# Patient Record
Sex: MALE | Race: WHITE | NOT HISPANIC OR LATINO | URBAN - METROPOLITAN AREA
[De-identification: names, ages, dates, MRNs, and addresses within clinical notes are randomized per-mention and may not be internally consistent; named-entity substitution may affect disease eponyms.]

---

## 2019-10-21 ENCOUNTER — OFFICE VISIT (OUTPATIENT)
Dept: PULMONOLOGY | Facility: HOSPICE | Age: 49
End: 2019-10-21
Payer: MEDICAID

## 2019-10-21 VITALS
BODY MASS INDEX: 35.01 KG/M2 | HEIGHT: 68 IN | SYSTOLIC BLOOD PRESSURE: 140 MMHG | OXYGEN SATURATION: 92 % | HEART RATE: 87 BPM | RESPIRATION RATE: 16 BRPM | DIASTOLIC BLOOD PRESSURE: 80 MMHG | WEIGHT: 231 LBS

## 2019-10-21 DIAGNOSIS — I27.20 PULMONARY HYPERTENSION (HCC): ICD-10-CM

## 2019-10-21 DIAGNOSIS — E66.01 CLASS 2 SEVERE OBESITY WITH SERIOUS COMORBIDITY AND BODY MASS INDEX (BMI) OF 35.0 TO 35.9 IN ADULT, UNSPECIFIED OBESITY TYPE (HCC): ICD-10-CM

## 2019-10-21 DIAGNOSIS — F17.200 SMOKING ADDICTION: ICD-10-CM

## 2019-10-21 DIAGNOSIS — J45.909 UNCOMPLICATED ASTHMA, UNSPECIFIED ASTHMA SEVERITY, UNSPECIFIED WHETHER PERSISTENT: ICD-10-CM

## 2019-10-21 PROCEDURE — 99406 BEHAV CHNG SMOKING 3-10 MIN: CPT | Mod: 25 | Performed by: INTERNAL MEDICINE

## 2019-10-21 PROCEDURE — 99204 OFFICE O/P NEW MOD 45 MIN: CPT | Mod: 25 | Performed by: INTERNAL MEDICINE

## 2019-10-21 RX ORDER — TOPIRAMATE 50 MG/1
50 TABLET, FILM COATED ORAL 2 TIMES DAILY
COMMUNITY

## 2019-10-21 RX ORDER — IPRATROPIUM BROMIDE AND ALBUTEROL SULFATE 2.5; .5 MG/3ML; MG/3ML
3 SOLUTION RESPIRATORY (INHALATION) 4 TIMES DAILY
COMMUNITY

## 2019-10-21 NOTE — PROGRESS NOTES
Chief Complaint   Patient presents with   • Establish Care     Ref by ELLIOTT Turner MD for pulmonary Hypertension        HPI:  Ms. Duran is a 49 year old female with a history cardiomyopathy, CKD, borderline DM, tobacco abuse (117 pack years), and morbid obesity, here today to establish care for pulmonary hypertension and shortness of breath evaluation. Patient had a prolong hospitalization from 08/06-08/30 at [facility] for acute hypoxemia respiratory failure requiring supplemental oxygen and treatment for asthma exacerbation, walking pneumonia, CKD, and decompensated CHF. No records was found on Care Everywhere and is awaiting for additional records. Patient was discharged and readmitted one week later in early September for worsening weight gain due to CHF exacerbation. She was aggressively diuresis and then discharged four days later. Patient was told that she has high pressure in her lung and was referred here to our clinic for further evaluation.     Currently, she reports feeling better and was taken off of her lasix by her PCP. She state having mild shortness of breath on exertion and intermittent productive cough. In terms of her asthma, she was diagnosed many years ago and has been placed on albuterol inhaler and nebulizer as needed. She uses her albuterol inhalers 2-3 times per day and albuterol nebulizer twice daily for shortness of breath. She was unable to complete her ordered PFTs in the past. She also report having sleeping issue and is not sure if she stopped breathing or not.    She is a current smoker (1.5 years with over 117 pack years). She has two dogs and her roommate has five cats. She is currently on full disability but previously worked as a forestry. She does not get her yearly flu vaccine but received her pneumococcal vaccine during her previous hospitalization back in September. In terms of exercise, she walk, bike, and manjit climbing on a daily basis.     Past Medical History:    Diagnosis Date   • Cough    • Painful breathing    • Shortness of breath    • Sputum production     GREEN    • Wheezing        History reviewed. No pertinent surgical history.    ROS:   Constitutional: Denies fevers, chills, night sweats, fatigue or weight loss  Eyes: Denies vision loss, pain, drainage, double vision  Ears, Nose, Throat: Denies earache, tinnitus, hoarseness  Cardiovascular: Denies chest pain, tightness, palpitations  Respiratory: See HPI  Sleep: See HPI   GI: Denies abdominal pain, nausea, vomiting, diarrhea  : Denies frequent urination, hematuria, painful urination  Musculoskeletal: Denies back pain, painful joints, sore muscles  Neurological: Denies headaches, seizures  Skin: Denies rashes, color changes  Psychiatric: Denies depression or thoughts of suicide  Hematologic: Denies bleeding tendency or clotting tendency  Allergic/Immunologic: Denies rhinitis, skin sensitivity  All the other ROS reviewed and negative     Social History     Socioeconomic History   • Marital status:      Spouse name: Not on file   • Number of children: Not on file   • Years of education: Not on file   • Highest education level: Not on file   Occupational History   • Not on file   Social Needs   • Financial resource strain: Not on file   • Food insecurity:     Worry: Not on file     Inability: Not on file   • Transportation needs:     Medical: Not on file     Non-medical: Not on file   Tobacco Use   • Smoking status: Current Every Day Smoker     Packs/day: 1.50     Years: 41.00     Pack years: 61.50     Types: Cigarettes   • Smokeless tobacco: Never Used   Substance and Sexual Activity   • Alcohol use: Not Currently   • Drug use: Never   • Sexual activity: Not on file   Lifestyle   • Physical activity:     Days per week: Not on file     Minutes per session: Not on file   • Stress: Not on file   Relationships   • Social connections:     Talks on phone: Not on file     Gets together: Not on file     Attends  "[employer] service: Not on file     Active member of club or organization: Not on file     Attends meetings of clubs or organizations: Not on file     Relationship status: Not on file   • Intimate partner violence:     Fear of current or ex partner: Not on file     Emotionally abused: Not on file     Physically abused: Not on file     Forced sexual activity: Not on file   Other Topics Concern   • Not on file   Social History Narrative   • Not on file     Patient has no allergy information on record.  Current Outpatient Medications on File Prior to Visit   Medication Sig Dispense Refill   • topiramate (TOPAMAX) 50 MG tablet Take 50 mg by mouth 2 times a day.     • ipratropium-albuterol (DUONEB) 0.5-2.5 (3) MG/3ML nebulizer solution 3 mL by Nebulization route 4 times a day.       No current facility-administered medications on file prior to visit.      /80 (BP Location: Left arm, Patient Position: Sitting, BP Cuff Size: Adult)   Pulse 87   Resp 16   Ht 1.715 m (5' 7.5\")   Wt 104.8 kg (231 lb)   SpO2 92%   History reviewed. No pertinent family history.    Physical Exam:    General: Morbidly, speaking in full sentence.   HEENT: PERRLA, EOMI, no scleral icterus, no nasal or oral lesions  Neck: No thyromegaly, no adenopathy, no bruits  Mallampatti: Grade III  Lungs: Decrease breath sounds with mild wheezes at the left base. No ronchi or crackles.   Heart: Regular rate and rhythm, no gallops or murmurs  Abdomen: Soft, benign, no organomegaly  Extremities: Trace of pitting edema. No clubbing or cyanosis  Neurologic: Cranial nerve, motor, and sensory exam are normal    Problems:   1. Pulmonary hypertension (HCC)    2. Uncomplicated asthma, unspecified asthma severity, unspecified whether persistent    3. Smoking addiction    4. Class 2 severe obesity with serious comorbidity and body mass index (BMI) of 35.0 to 35.9 in adult, unspecified obesity type (HCC)      Assessment/Plan:   # Pulmonary hypertension   -- " Patient with recent hospital admission for acute decompensated CHF and ?asthma exacerbation. No echocardiogram results available for review. Given her risk factors, differentials include Group II from left atrial hypertension/HFpEF vs Group III due to uncontrolled RENEE and nocturnal hypoxemia.    -- Will obtain records from [facility]   -- Given patient has been significantly diuresed and she feels like her dry weight is around ~230 lbs then will obtain repeat echocardiogram for comparison. If her pulmonary hypertension is due to volume overload then I would expect her RVSP to improve. However if her RVSP remains elevated with evidence of RV dysfunction then she may benefit from RHC and additional PH workup.   -- Advised patient to check daily weight and if her weight is above 235 pounds then she will need to resume back her home lasix.     # Self reported asthma   -- Patient with a longstanding history of asthma with no formal diagnosis. Wheezing is not a classical sign of asthma and this can be found in volume overload which causes cardiac wheezing.  -- Will obtain full PFTs and methacholine challenge test   -- Cont using albuterol as needed    -- Further treatment recommendation is dependent upon PFTs findings     # Tobacco abuse   -- Current smoker for over 117  pack years who is not interested to quit smoking. Discussed risks associated with smoking and therapies available for smoking cessation if patient decide to quit smoking for 10 minutes. Risk associated with smoking includes, but not limited to increased cardiovascular disease, lung disease, cancer, and osteoporosis.  Patient endorsed understanding but she still does not want to quit smoking as last time she gained significant weight and her whole family smoke.    # Morbid Obesity   - Counseled on low fat dietary which should focused on heart healthy eating patterns (include fibers, fruits, and whole grains) and to decrease consumption of salt,  fat, and red processed meats. Physical activity emphasized to gradually increase aerobic activity as tolerated and to try to maintain at least 30 minutes per day.     RTC 3 months     Loreto Lala MD   Pulmonary Critical Care   [facility]